# Patient Record
Sex: MALE | Race: WHITE | NOT HISPANIC OR LATINO | Employment: UNEMPLOYED | URBAN - METROPOLITAN AREA
[De-identification: names, ages, dates, MRNs, and addresses within clinical notes are randomized per-mention and may not be internally consistent; named-entity substitution may affect disease eponyms.]

---

## 2017-09-28 ENCOUNTER — OFFICE VISIT (OUTPATIENT)
Dept: URGENT CARE | Facility: CLINIC | Age: 22
End: 2017-09-28
Payer: MEDICAID

## 2017-09-28 VITALS
OXYGEN SATURATION: 100 % | DIASTOLIC BLOOD PRESSURE: 82 MMHG | WEIGHT: 150 LBS | SYSTOLIC BLOOD PRESSURE: 129 MMHG | HEIGHT: 72 IN | TEMPERATURE: 97 F | RESPIRATION RATE: 18 BRPM | BODY MASS INDEX: 20.32 KG/M2 | HEART RATE: 93 BPM

## 2017-09-28 DIAGNOSIS — L02.612 ABSCESS OF FIFTH TOE, LEFT: Primary | ICD-10-CM

## 2017-09-28 PROCEDURE — 3008F BODY MASS INDEX DOCD: CPT | Mod: S$GLB,,, | Performed by: FAMILY MEDICINE

## 2017-09-28 PROCEDURE — 99203 OFFICE O/P NEW LOW 30 MIN: CPT | Mod: S$GLB,,, | Performed by: FAMILY MEDICINE

## 2017-09-29 NOTE — PATIENT INSTRUCTIONS
Highly recommend to see a Surgeon 1st thing tomorrow morning but  if patient cannot be seen by one, needs to go to the E.R.  Will start with oral antibx but needs to see a surgeon 1st thing tomorrow morning.

## 2017-09-29 NOTE — PROGRESS NOTES
Subjective:       Patient ID: Nestor Silva is a 21 y.o. male.    Vitals:  height is 6' (1.829 m) and weight is 68 kg (150 lb). His temperature is 97.4 °F (36.3 °C). His blood pressure is 129/82 and his pulse is 93. His respiration is 18 and oxygen saturation is 100%.     Chief Complaint: Toe Pain (Pt c/o lt 5th toe pain with swelling and rednees, no known injury)    Toe Pain    The incident occurred 5 to 7 days ago. There was no injury mechanism. The pain is present in the left foot. The quality of the pain is described as aching and burning. The pain is at a severity of 8/10. The pain is severe. The pain has been constant since onset. Associated symptoms include an inability to bear weight. He reports no foreign bodies present. The symptoms are aggravated by weight bearing.     Review of Systems   Constitution: Negative for chills and fever.   HENT: Negative for sore throat.    Eyes: Negative for blurred vision.   Cardiovascular: Negative for chest pain.   Respiratory: Negative for shortness of breath.    Skin: Negative for rash.   Musculoskeletal: Positive for joint pain and joint swelling. Negative for back pain.   Gastrointestinal: Negative for abdominal pain, diarrhea, nausea and vomiting.   Neurological: Negative for headaches.   Psychiatric/Behavioral: The patient is not nervous/anxious.        Objective:      Physical Exam   Constitutional: He is oriented to person, place, and time. He appears well-developed and well-nourished.   HENT:   Head: Normocephalic and atraumatic. Head is without abrasion, without contusion and without laceration.   Right Ear: External ear normal.   Left Ear: External ear normal.   Nose: Nose normal.   Mouth/Throat: Oropharynx is clear and moist.   Eyes: Conjunctivae, EOM and lids are normal. Pupils are equal, round, and reactive to light.   Neck: Trachea normal, full passive range of motion without pain and phonation normal. Neck supple.   Cardiovascular: Normal rate, regular  rhythm and normal heart sounds.    Pulmonary/Chest: Effort normal and breath sounds normal. No stridor. No respiratory distress.   Musculoskeletal: Normal range of motion.   Neurological: He is alert and oriented to person, place, and time.   Skin: Skin is warm, dry and intact. Capillary refill takes less than 2 seconds. No abrasion, no bruising, no burn, no ecchymosis, no laceration, no lesion and no rash noted. No erythema.   Entire left 5th toe phalanx is erythematous, moderately to severely swollen, very TTP, fluctuant, small punctum draining purulent discharge.   Psychiatric: He has a normal mood and affect. His speech is normal and behavior is normal. Judgment and thought content normal. Cognition and memory are normal.   Nursing note and vitals reviewed.      Assessment:       1. Abscess of fifth toe, left        Plan:         Abscess of fifth toe, left        Patient decided to go to E.R. Tonight.

## 2018-03-13 PROBLEM — K81.0 ACUTE ACALCULOUS CHOLECYSTITIS: Status: ACTIVE | Noted: 2018-03-13

## 2018-03-13 PROBLEM — K80.00 CHOLELITHIASIS WITH ACUTE CHOLECYSTITIS: Status: ACTIVE | Noted: 2018-03-13

## 2018-03-13 PROBLEM — E87.20 LACTIC ACIDOSIS: Status: ACTIVE | Noted: 2018-03-13

## 2018-03-13 PROBLEM — Z72.0 TOBACCO ABUSE: Chronic | Status: ACTIVE | Noted: 2018-03-13

## 2018-03-13 PROBLEM — N17.9 ACUTE RENAL FAILURE: Status: ACTIVE | Noted: 2018-03-13

## 2018-03-13 PROBLEM — D72.829 LEUKOCYTOSIS: Status: ACTIVE | Noted: 2018-03-13

## 2018-03-14 PROBLEM — E87.20 LACTIC ACIDOSIS: Status: RESOLVED | Noted: 2018-03-13 | Resolved: 2018-03-14

## 2019-04-11 DIAGNOSIS — B18.2 HEPATITIS C, CHRONIC: Primary | ICD-10-CM
